# Patient Record
Sex: FEMALE | Race: WHITE | NOT HISPANIC OR LATINO | URBAN - METROPOLITAN AREA
[De-identification: names, ages, dates, MRNs, and addresses within clinical notes are randomized per-mention and may not be internally consistent; named-entity substitution may affect disease eponyms.]

---

## 2019-08-01 ENCOUNTER — APPOINTMENT (RX ONLY)
Dept: URBAN - METROPOLITAN AREA CLINIC 44 | Facility: CLINIC | Age: 34
Setting detail: DERMATOLOGY
End: 2019-08-01

## 2019-08-01 ENCOUNTER — APPOINTMENT (RX ONLY)
Dept: URBAN - METROPOLITAN AREA CLINIC 45 | Facility: CLINIC | Age: 34
Setting detail: DERMATOLOGY
End: 2019-08-01

## 2019-08-01 DIAGNOSIS — L29.89 OTHER PRURITUS: ICD-10-CM

## 2019-08-01 DIAGNOSIS — L29.8 OTHER PRURITUS: ICD-10-CM

## 2019-08-01 DIAGNOSIS — L30.9 DERMATITIS, UNSPECIFIED: ICD-10-CM

## 2019-08-01 PROBLEM — I10 ESSENTIAL (PRIMARY) HYPERTENSION: Status: ACTIVE | Noted: 2019-08-01

## 2019-08-01 PROCEDURE — ? ADDITIONAL NOTES

## 2019-08-01 PROCEDURE — ? COUNSELING

## 2019-08-01 PROCEDURE — 99202 OFFICE O/P NEW SF 15 MIN: CPT

## 2019-08-01 PROCEDURE — ? PRESCRIPTION

## 2019-08-01 PROCEDURE — ? ORDER TESTS

## 2019-08-01 RX ORDER — DESONIDE 0.5 MG/G
1 CREAM TOPICAL BID
Qty: 1 | Refills: 0 | Status: ERX | COMMUNITY
Start: 2019-08-01

## 2019-08-01 RX ADMIN — DESONIDE 1: 0.5 CREAM TOPICAL at 14:47

## 2019-08-01 ASSESSMENT — LOCATION DETAILED DESCRIPTION DERM
LOCATION DETAILED: MID POSTERIOR NECK
LOCATION DETAILED: LEFT AXILLARY VAULT
LOCATION DETAILED: RIGHT AXILLARY VAULT
LOCATION DETAILED: MID POSTERIOR NECK
LOCATION DETAILED: RIGHT AXILLARY VAULT
LOCATION DETAILED: LEFT AXILLARY VAULT

## 2019-08-01 ASSESSMENT — SEVERITY ASSESSMENT
SEVERITY: MILD TO MODERATE
SEVERITY: MILD TO MODERATE

## 2019-08-01 ASSESSMENT — LOCATION SIMPLE DESCRIPTION DERM
LOCATION SIMPLE: RIGHT AXILLARY VAULT
LOCATION SIMPLE: RIGHT AXILLARY VAULT
LOCATION SIMPLE: POSTERIOR NECK
LOCATION SIMPLE: POSTERIOR NECK
LOCATION SIMPLE: LEFT AXILLARY VAULT
LOCATION SIMPLE: LEFT AXILLARY VAULT

## 2019-08-01 ASSESSMENT — LOCATION ZONE DERM
LOCATION ZONE: NECK
LOCATION ZONE: AXILLAE
LOCATION ZONE: NECK
LOCATION ZONE: AXILLAE

## 2019-08-01 NOTE — PROCEDURE: ADDITIONAL NOTES
Additional Notes: Pruritus / mild irritational dermatitis of axillae x 4 months.  Patient says she has not changed her personal care products etc.  She feels sweating make it worse.  \\n\\nWoods Lamp is negative. Swabbed to ensure no bacterial overgrowth.\\n\\nDiscussed that irritant or allergic contact dermatitis would be most common.  Encourage gentle skin care as well as SKIN DIET, handout given.  \\n\\nStart- Desonide cream apply to armpits and back of neck twice daily for 3 weeks\\n\\nRTC 3 weeks
Detail Level: Detailed
Additional Notes: Looks eczematous on posterior neck.  Patient says it is a \"fat roll.\"  Will try DESONIDE on it.

## 2019-08-01 NOTE — PROCEDURE: ORDER TESTS
Expected Date Of Service: 08/01/2019
Bill For Surgical Tray: no
Billing Type: Third-Party Bill
Performing Laboratory: [id number]
Lab Facility: [id number]

## 2019-08-01 NOTE — PROCEDURE: ADDITIONAL NOTES
Detail Level: Detailed
Additional Notes: Pruritus / mild irritational dermatitis of axillae x 4 months.  Patient says she has not changed her personal care products etc.  She feels sweating make it worse.  \\n\\nWoods Lamp is negative. Swabbed to ensure no bacterial overgrowth.\\n\\nDiscussed that irritant or allergic contact dermatitis would be most common.  Encourage gentle skin care as well as SKIN DIET, handout given.  \\n\\nStart- Desonide cream apply to armpits and back of neck twice daily for 3 weeks\\n\\nRTC 3 weeks
Additional Notes: Looks eczematous on posterior neck.  Patient says it is a \"fat roll.\"  Will try DESONIDE on it.

## 2024-03-15 NOTE — PROCEDURE: MIPS QUALITY
Dylan Neal presents to clinic today at the request of Rica Ennis MD (ordering provider) for Allergy Immunotherapy injection(s).       This service provided today was under the care of Rica Ennis MD; the supervising provider of the day; who was available if needed.      Patient presented after waiting 30 minutes with no reaction to  injections. Discharged from clinic.    Paloma Cruz RN    
Quality 431: Preventive Care And Screening: Unhealthy Alcohol Use - Screening: Patient screened for unhealthy alcohol use using a single question and scores less than 2 times per year
Quality 130: Documentation Of Current Medications In The Medical Record: Current Medications Documented
Detail Level: Detailed
Quality 226: Preventive Care And Screening: Tobacco Use: Screening And Cessation Intervention: Patient screened for tobacco use and is an ex/non-smoker